# Patient Record
Sex: MALE | Race: WHITE | Employment: FULL TIME | URBAN - METROPOLITAN AREA
[De-identification: names, ages, dates, MRNs, and addresses within clinical notes are randomized per-mention and may not be internally consistent; named-entity substitution may affect disease eponyms.]

---

## 2020-06-08 PROCEDURE — 88305 TISSUE EXAM BY PATHOLOGIST: CPT | Performed by: INTERNAL MEDICINE

## 2020-11-11 ENCOUNTER — APPOINTMENT (OUTPATIENT)
Dept: GENERAL RADIOLOGY | Facility: HOSPITAL | Age: 55
DRG: 177 | End: 2020-11-11
Attending: EMERGENCY MEDICINE
Payer: COMMERCIAL

## 2020-11-11 ENCOUNTER — HOSPITAL ENCOUNTER (INPATIENT)
Facility: HOSPITAL | Age: 55
LOS: 5 days | Discharge: HOME | DRG: 177 | End: 2020-11-16
Attending: EMERGENCY MEDICINE | Admitting: HOSPITALIST
Payer: COMMERCIAL

## 2020-11-11 DIAGNOSIS — U07.1 PNEUMONIA DUE TO COVID-19 VIRUS: Primary | ICD-10-CM

## 2020-11-11 DIAGNOSIS — R09.02 HYPOXIA: ICD-10-CM

## 2020-11-11 DIAGNOSIS — J12.82 PNEUMONIA DUE TO COVID-19 VIRUS: Primary | ICD-10-CM

## 2020-11-11 PROCEDURE — XW033E5 INTRODUCTION OF REMDESIVIR ANTI-INFECTIVE INTO PERIPHERAL VEIN, PERCUTANEOUS APPROACH, NEW TECHNOLOGY GROUP 5: ICD-10-PCS | Performed by: HOSPITALIST

## 2020-11-11 PROCEDURE — 71045 X-RAY EXAM CHEST 1 VIEW: CPT | Performed by: EMERGENCY MEDICINE

## 2020-11-11 PROCEDURE — 3E033GC INTRODUCTION OF OTHER THERAPEUTIC SUBSTANCE INTO PERIPHERAL VEIN, PERCUTANEOUS APPROACH: ICD-10-PCS | Performed by: HOSPITALIST

## 2020-11-11 RX ORDER — DEXAMETHASONE 6 MG/1
6 TABLET ORAL DAILY
Status: DISCONTINUED | OUTPATIENT
Start: 2020-11-12 | End: 2020-11-16

## 2020-11-11 RX ORDER — ONDANSETRON 2 MG/ML
4 INJECTION INTRAMUSCULAR; INTRAVENOUS EVERY 6 HOURS PRN
Status: DISCONTINUED | OUTPATIENT
Start: 2020-11-11 | End: 2020-11-16

## 2020-11-11 RX ORDER — SODIUM CHLORIDE 0.9 % (FLUSH) 0.9 %
3 SYRINGE (ML) INJECTION AS NEEDED
Status: DISCONTINUED | OUTPATIENT
Start: 2020-11-11 | End: 2020-11-16

## 2020-11-11 RX ORDER — DEXAMETHASONE SODIUM PHOSPHATE 10 MG/ML
6 INJECTION, SOLUTION INTRAMUSCULAR; INTRAVENOUS ONCE
Status: COMPLETED | OUTPATIENT
Start: 2020-11-11 | End: 2020-11-11

## 2020-11-11 RX ORDER — ENOXAPARIN SODIUM 100 MG/ML
40 INJECTION SUBCUTANEOUS 2 TIMES DAILY
Status: DISCONTINUED | OUTPATIENT
Start: 2020-11-12 | End: 2020-11-16

## 2020-11-12 PROCEDURE — 99222 1ST HOSP IP/OBS MODERATE 55: CPT | Performed by: HOSPITALIST

## 2020-11-12 PROCEDURE — 99232 SBSQ HOSP IP/OBS MODERATE 35: CPT | Performed by: INTERNAL MEDICINE

## 2020-11-12 RX ORDER — SODIUM CHLORIDE 9 MG/ML
INJECTION, SOLUTION INTRAVENOUS ONCE
Status: DISCONTINUED | OUTPATIENT
Start: 2020-11-12 | End: 2020-11-16

## 2020-11-12 RX ORDER — ASCORBIC ACID 500 MG
500 TABLET ORAL DAILY
Status: DISCONTINUED | OUTPATIENT
Start: 2020-11-12 | End: 2020-11-16

## 2020-11-12 RX ORDER — ZINC SULFATE 50(220)MG
220 CAPSULE ORAL DAILY
Status: DISCONTINUED | OUTPATIENT
Start: 2020-11-12 | End: 2020-11-16

## 2020-11-12 RX ORDER — FAMOTIDINE 20 MG/1
20 TABLET ORAL 2 TIMES DAILY
Status: DISCONTINUED | OUTPATIENT
Start: 2020-11-12 | End: 2020-11-16

## 2020-11-12 RX ORDER — ACETAMINOPHEN 325 MG/1
650 TABLET ORAL EVERY 6 HOURS PRN
COMMUNITY

## 2020-11-12 RX ORDER — MAGNESIUM OXIDE 400 MG (241.3 MG MAGNESIUM) TABLET
1 TABLET NIGHTLY
Status: DISCONTINUED | OUTPATIENT
Start: 2020-11-12 | End: 2020-11-16

## 2020-11-12 RX ORDER — POTASSIUM CHLORIDE 20 MEQ/1
40 TABLET, EXTENDED RELEASE ORAL ONCE
Status: COMPLETED | OUTPATIENT
Start: 2020-11-12 | End: 2020-11-12

## 2020-11-12 RX ORDER — ACETAMINOPHEN 325 MG/1
650 TABLET ORAL EVERY 6 HOURS PRN
Status: DISCONTINUED | OUTPATIENT
Start: 2020-11-12 | End: 2020-11-16

## 2020-11-12 RX ORDER — ASCORBIC ACID 500 MG
500 TABLET ORAL DAILY
COMMUNITY

## 2020-11-12 RX ORDER — POTASSIUM CHLORIDE 20 MEQ/1
40 TABLET, EXTENDED RELEASE ORAL ONCE
Status: DISCONTINUED | OUTPATIENT
Start: 2020-11-12 | End: 2020-11-16

## 2020-11-12 NOTE — PROGRESS NOTES
ADMISSION NOTE    63year old male presents with no significant PMH presents with 5 days of cough and sob with increased sob over the past 2 days. Also reports multiple family members with whom he lives are covid postive.   Pt was found to be hypoxic and c

## 2020-11-12 NOTE — PAYOR COMM NOTE
--------------  ADMISSION REVIEW     Payor: [facility] #:  [id number]  Authorization Number: [id number]    Admit date: 11/11/20  Admit time: 2353       Admitting Physician: Doris Lind MD  Attending Physician:  Long Luna MD stated in HPI.     Physical Exam     ED Triage Vitals [11/11/20 1935]   /74   Pulse 83   Resp 18   Temp 98 °F (36.7 °C)   Temp src    SpO2 (!) 88 %   O2 Device None (Room air)       Current:/80   Pulse 85   Temp 98 °F (36.7 °C)   Resp 26   Ht 18 WITH DIFFERENTIAL WITH PLATELET.   Procedure                               Abnormality         Status                     ---------                               -----------         ------                     CBC W/ DIFFERENTIAL[[id number]] decadron. Remdisivir.   Conv plasma per id/pulmonary  Admission disposition: 11/11/2020  9:50 PM                        Disposition and Plan     Clinical Impression:  Pneumonia due to COVID-19 virus  (primary encounter diagnosis)  Hypoxia    Disposition: car rides, no other travel history. Denies any calf pain or swelling.   He reports that his shortness of breath is worse with very minimal activity and improves with rest.  He was actually scheduled to have a COVID test done today, but presented to the [facility] did have an endoscopic polypectomy in June of this year. MEDICATIONS:  Medications prior to admission:  Tylenol 325 mg every 6 hours as needed and vitamin C 500 mg daily.     FAMILY HISTORY:  The patient reports a strong family history of diabetes, but h as was chest x-ray. Urinalysis revealed 4 white cells, 0 red cells, no bacteria, few squamous epithelial cells. Blood cultures are pending. ASSESSMENT AND PLAN:    1.    COVID-positive pneumonia, possibly a combination of viral and bacterial.  Continue mg     Date Action Dose Route User    11/11/2020 2045 Given 6 mg Intravenous Angela Cottrell RN      Enoxaparin Sodium (LOVENOX) 40 MG/0.4ML injection 40 mg     Date Action Dose Route User    11/12/2020 0800 Given 40 mg Subcutaneous (Left Upper Abdomen) (1.829 m), weight (!) 301 lb 4.8 oz (136.7 kg), SpO2 90 %.     Constitutional: no acute distress  Eyes: PERRL  ENT: nares patent  Cardio: RRR, S1 S2  Respiratory: Crackles present  GI: abdomen soft, non tender  Extremities: no clubbing, cyanosis, edema  Ne liters. No fever.   Pulmonology following  Awaiting CCP  Continue remesivir  Continue decadron   Follow inflammatory markers   lovenox for DVT proph     Will resume care in AM

## 2020-11-12 NOTE — PLAN OF CARE
Problem: Patient Centered Care  Goal: Patient preferences are identified and integrated in the patient's plan of care  Description: Interventions:  - What would you like us to know as we care for you? \" I am an  and I work\".    - Provide timely, document skin integrity  - Monitor for areas of redness and/or skin breakdown  - Initiate interventions, skin care algorithm/standards of care as needed  Outcome: Not Progressing   Redness/rash to scrotum/groin-miconazole ordered, continue to monitor.     P Not Progressing     Problem: RESPIRATORY - ADULT  Goal: Achieves optimal ventilation and oxygenation  Description: INTERVENTIONS:  - Assess for changes in respiratory status  - Assess for changes in mentation and behavior  - Position to facilitate oxygenat

## 2020-11-12 NOTE — ED NOTES
Assumed care of patient from triage. Patient to ED from home for SOB. Patient states that multiple family members have tested COVID positive. Patient reports having symptoms for about 1 week.  Patient's oxygen saturation 84% on room air when he initially wa

## 2020-11-12 NOTE — PLAN OF CARE
Problem: Patient Centered Care  Goal: Patient preferences are identified and integrated in the patient's plan of care  Description: Interventions:  - What would you like us to know as we care for you? \" I am an  and I work\".    - Provide timely, Manage/alleviate anxiety  - Monitor for signs/symptoms of CO2 retention  -Pulmonology consult  -Therapeutic proning  -Incentive spirometer  -Follow up w/ pneumonia clinic post discharge  Outcome: Progressing     Problem: GASTROINTESTINAL - ADULT  Goal: Janis injury  Description: INTERVENTIONS:  - Assess pt frequently for physical needs  - Identify cognitive and physical deficits and behaviors that affect risk of falls.   - [place] fall precautions as indicated by assessment.  - Educate pt/family on patient sa

## 2020-11-12 NOTE — PLAN OF CARE
Covid +: 11/11/2020  Symptom Onset:11/07/2020  Tmax: Afebrile  O2: 3L at 90%    Monitor Labs  LDH: 421 ->  Ferritin: 562.0 ->  CRP: 9.80  DDimer: 0.36    Antibiotics: IV Azithromycin and Rocephin   Blood Thinner: lovenox  Decadron: started 11/11  Actemra:

## 2020-11-12 NOTE — ED NOTES
Orders for admission, patient is aware of plan and ready to go upstairs. Any questions, please call ED SHAUN Zarate  at extension [phone].    Type of COVID test sent:  COVID Suspicion level: /High    Titratable drug(s) infusing:  Rate: n/a    LOC at time of transp

## 2020-11-12 NOTE — PROGRESS NOTES
Post midnight follow up    Patient is stable saturating 90% on 3 liters. No fever.   Pulmonology following  Awaiting CCP  Continue remesivir  Continue decadron   Follow inflammatory markers   lovenox for DVT proph    Will resume care in AM    González Madden

## 2020-11-12 NOTE — ED INITIAL ASSESSMENT (HPI)
PT c/o uri symptoms with multiple COVID+ contacts at home, pt states he has been sob for the last week.

## 2020-11-12 NOTE — ED PROVIDER NOTES
Patient Seen in: [facility] Emergency Department    History   Patient presents with:  Difficulty Breathing    Stated Complaint:     HPI    Patient complains of shortness of breath that began 5 days ago.  + cough. no chest pain. + fever.   no debbie intact, no focal deficit noted  SKIN: good skin turgor, no  rashes  PSYCH: calm, cooperative,    Differential includes: pneumonia likely covid vs. CHF vs. bronchitis vs. PE      ED Course     Labs Reviewed   BASIC METABOLIC PANEL (8) - Abnormal; Notable fo ZIZSABRINA)[[id number]]                               Final result               ANTIBODY FBIYMP[[id number]]                                  Final result                 Please view results for these tests on the individual orders.    ABORH (BLOOD TYPE)   ANTIBODY patient.       Present on Admission           ICD-10-CM Noted POA    Pneumonia due to COVID-19 virus U07.1, J12.89 11/11/2020 Unknown

## 2020-11-12 NOTE — CONSULTS
[facility] HOSP - [facility]    Report of Consultation    Mia Marc Patient Status:  Inpatient    3/29/1965 MRN [id number]   Location [facility] 5SW/SE Attending Ciro Villaseñor MD   Hosp Day # 1 PCP Cholo Denson MD     Date of Admission:  6 chloride 0.9% 250 mL IVPB, 100 mg, Intravenous, Q24H    •  sodium chloride 0.9% IV bolus 500 mL, 500 mL, Intravenous, PRN    •  Normal Saline Flush 0.9 % injection 3 mL, 3 mL, Intravenous, PRN    •  Enoxaparin Sodium (LOVENOX) 40 MG/0.4ML injection 40 mg, CREATSERUM 0.82 11/12/2020    BUN 14 11/12/2020     11/12/2020    K 4.4 11/12/2020     11/12/2020    CO2 29.0 11/12/2020     (H) 11/12/2020    CA 8.3 (L) 11/12/2020    ALB 2.7 (L) 11/12/2020    ALKPHO 59 11/12/2020    TP 7.2 11/12/2020

## 2020-11-12 NOTE — H&P
[facility]    PATIENT'S NAME: Emy [facility]   ATTENDING PHYSICIAN: Niles Sacks, MD   PATIENT ACCOUNT#:   [id number]    LOCATION:  [address] [place] [address] RECORD #:   [id number]       YOB: 1965  ADMISSION DATE:       11/11/2020 on remdesivir as well.   Procalcitonin level was also elevated and, given the concern on x-ray for the possibility of a combination of bacterial and viral process, he was given ceftriaxone and Zithromax as well pending evaluation by Pulmonary and Infectious dry.  Crowding of the oropharynx was noted. NECK:  Supple. LUNGS:  Coarse breath sounds bilaterally. No wheezes or rhonchi. HEART:  Regular rate and rhythm. Normal S1, S2.  ABDOMEN:  Soft, nontender, with normoactive bowel sounds. No guarding.   No above.    (Also job [id number])    Dictated By Zandra Lake MD  d: 11/12/2020 07:10:59  t: 11/12/2020 09:55:13  Job [id number]  GEORGINAJ/

## 2020-11-12 NOTE — CONSULTS
[place] [place] [facility] - [facility] ID CONSULT NOTE    Alfredo Mayfield Patient Status:  Inpatient    3/29/1965 MRN [id number]   Location [facility] 5SW/SE Attending Agustin Scott MD   Hosp Day # 1 PCP Alejandra Teixeira MD       Reason for Cons sodium chloride 0.9% 250 mL IVPB, 100 mg, Intravenous, Q24H  •  sodium chloride 0.9% IV bolus 500 mL, 500 mL, Intravenous, PRN  •  Normal Saline Flush 0.9 % injection 3 mL, 3 mL, Intravenous, PRN  •  Enoxaparin Sodium (LOVENOX) 40 MG/0.4ML injection 40 mg, GFRNAA 96 98 99   CA 8.5 8.6 8.3*   ALB 3.0* 3.0* 2.7*    138 139   K 3.7 3.8 4.4    102 105   CO2 25.0 30.0 29.0   ALKPHO 60 55 59   AST 80* 74* 58*   ALT 71* 72* 69*   BILT 0.6 0.6 0.4   TP 7.5 7.6 7.2       Microbiology: Reviewed in EMR

## 2020-11-13 PROCEDURE — 99233 SBSQ HOSP IP/OBS HIGH 50: CPT | Performed by: HOSPITALIST

## 2020-11-13 PROCEDURE — 99232 SBSQ HOSP IP/OBS MODERATE 35: CPT | Performed by: INTERNAL MEDICINE

## 2020-11-13 NOTE — PROGRESS NOTES
Called blood bank to check on status of plasma. Blood bank is still waiting for plasma. They will call when it has been received.

## 2020-11-13 NOTE — PLAN OF CARE
Problem: Patient Centered Care  Goal: Patient preferences are identified and integrated in the patient's plan of care  Description: Interventions:  - What would you like us to know as we care for you? \" I am an  and I work\".    - Provide timely, Manage/alleviate anxiety  - Monitor for signs/symptoms of CO2 retention  -Pulmonology consult  -Therapeutic proning  -Incentive spirometer  -Follow up w/ pneumonia clinic post discharge  Outcome: Progressing     Problem: GASTROINTESTINAL - ADULT  Goal: Janis injury  Description: INTERVENTIONS:  - Assess pt frequently for physical needs  - Identify cognitive and physical deficits and behaviors that affect risk of falls.   - [place] fall precautions as indicated by assessment.  - Educate pt/family on patient sa

## 2020-11-13 NOTE — PLAN OF CARE
Problem: Patient Centered Care  Goal: Patient preferences are identified and integrated in the patient's plan of care  Description: Interventions:  - What would you like us to know as we care for you? \" I am an  and I work\".    - Provide timely, Manage/alleviate anxiety  - Monitor for signs/symptoms of CO2 retention  -Pulmonology consult  -Therapeutic proning  -Incentive spirometer  -Follow up w/ pneumonia clinic post discharge  Outcome: Progressing     Problem: GASTROINTESTINAL - ADULT  Goal: Janis injury  Description: INTERVENTIONS:  - Assess pt frequently for physical needs  - Identify cognitive and physical deficits and behaviors that affect risk of falls.   - [place] fall precautions as indicated by assessment.  - Educate pt/family on patient sa

## 2020-11-13 NOTE — PROGRESS NOTES
[facility]    Progress Note    Darryl Daley Patient Status:  Inpatient    3/29/1965 MRN [id number]   Location [facility] 5SW/SE Attending Royce Scanlon MD   Hosp Day # 2 PCP Shelby Weber MD       Subjective:    Darryl Daley 11/11/2020 at 9:34 PM          Ekg 12-lead    Result Date: 11/12/2020  ECG Report  Interpretation  -------------------------- Sinus Rhythm Low voltage in precordial leads.  ABNORMAL No previous ECGs available Electronically signed on 11/12/2020 at 12:06 by

## 2020-11-13 NOTE — DIETARY NOTE
ADULT NUTRITION INITIAL ASSESSMENT    Pt is at moderate nutrition risk. Pt does not meet malnutrition criteria.       RECOMMENDATIONS TO MD:  See Nutrition Intervention     NUTRITION DIAGNOSIS/PROBLEM:  Inadequate oral intake related to decreased ability t III  IBW: 190 lbs        159% IBW  Usual Body Wt: 310 lbs       97% UBW    WEIGHT HISTORY:  Patient Weight(s) for the past 336 hrs:   Weight   11/13/20 0534 (!) 137.3 kg (302 lb 12.8 oz)   11/12/20 0700 (!) 136.7 kg (301 lb 4.8 oz)   11/11/20 2354 (!) 136. Food and Nutrient Intake:      Monitor: adequacy of PO intake and tolerance of PO intake.   - Anthropometric Measurement:      Monitor weight  - Nutrition Goals:      PO greater than 75% of meals, labs WNL and prevent skin breakdown    DIETITIAN FOLLOW UP:

## 2020-11-13 NOTE — PROGRESS NOTES
[facility]     Progress Note        Saint Blanch Patient Status:  Inpatient    3/29/1965 MRN [id number]   Location [facility] 5SW/SE Attending Maryann Mcleod MD   Hosp Day # 2 PCP Nicolette Olmos MD       Subjective:   Patient se azithromycin (ZITHROMAX) 500 mg in sodium chloride 0.9% 250 mL IVPB, 500 mg, Intravenous, Q24H        Continuous Infusions:     Physical Exam  Constitutional: no acute distress  Eyes: PERRL  ENT: nares pateint  Cardio: RRR, S1 S2  Respiratory: Bilateral cr CCP  -Continue Decadron  -Continue to follow inflammatory markers  -Wean oxygen as tolerated  -DVT prophylaxis: Lovenox    Candelaria Amor DO  Pulmonary [address] [facility]

## 2020-11-13 NOTE — PLAN OF CARE
Covid +: 11/11/2020  Symptom Onset:11/07/2020  Tmax: Afebrile  O2: 2.5L at 90%, attempt to wean    Monitor Labs  LDH: 421 -> 393  Ferritin: 562.0 -> 457.4  CRP: 9.80 -> 5.51  DDimer: 0.36 -> 0.27    Antibiotics: IV Azithromycin and Rocephin   Blood Thinner

## 2020-11-14 PROCEDURE — 99233 SBSQ HOSP IP/OBS HIGH 50: CPT | Performed by: HOSPITALIST

## 2020-11-14 PROCEDURE — 99233 SBSQ HOSP IP/OBS HIGH 50: CPT | Performed by: INTERNAL MEDICINE

## 2020-11-14 RX ORDER — VANCOMYCIN HYDROCHLORIDE 125 MG/1
125 CAPSULE ORAL DAILY
Status: DISCONTINUED | OUTPATIENT
Start: 2020-11-14 | End: 2020-11-16

## 2020-11-14 NOTE — PLAN OF CARE
Patient reports dry eyes and dry mouth; encouraged patient to wear glasses instead of contacts. 3L O2 nasal cannula at rest. Denies chest pain, dizziness, or weakness. Independent with self care. Lovenox SQ an PO decadron given per order.   Calls appropri patient for signs and symptoms of electrolyte imbalances  - Administer electrolyte replacement as ordered  - Monitor response to electrolyte replacements, including rhythm and repeat lab results as appropriate  - Fluid restriction as ordered  - Instruct pa and transportation as appropriate  - Identify discharge learning needs (meds, wound care, etc)  - Arrange for interpreters to assist at discharge as needed  - Consider post-discharge preferences of patient/family/discharge partner  - Complete POLST form as

## 2020-11-14 NOTE — PROGRESS NOTES
[facility]    Progress Note    Sage Siu Patient Status:  Inpatient    3/29/1965 MRN [id number]   Location [facility] 5SW/SE Attending Steve Licea MD   Hosp Day # 3 PCP Boy Matthew MD        Subjective:     Ammonti prophylaxis: Lovenox    High risk / complex   D/w staff               Results:     Lab Results   Component Value Date    WBC 8.1 11/14/2020    HGB 14.4 11/14/2020    HCT 42.7 11/14/2020    .0 11/14/2020    CREATSERUM 0.60 (L) 11/14/2020    BUN 18 11

## 2020-11-14 NOTE — PLAN OF CARE
Problem: Patient Centered Care  Goal: Patient preferences are identified and integrated in the patient's plan of care  Description: Interventions:  - What would you like us to know as we care for you? \" I am an  and I work\".    - Provide timely, Manage/alleviate anxiety  - Monitor for signs/symptoms of CO2 retention  -Pulmonology consult  -Therapeutic proning  -Incentive spirometer  -Follow up w/ pneumonia clinic post discharge  Outcome: Progressing     Problem: GASTROINTESTINAL - ADULT  Goal: Janis injury  Description: INTERVENTIONS:  - Assess pt frequently for physical needs  - Identify cognitive and physical deficits and behaviors that affect risk of falls.   - [place] fall precautions as indicated by assessment.  - Educate pt/family on patient sa

## 2020-11-15 PROCEDURE — 99232 SBSQ HOSP IP/OBS MODERATE 35: CPT | Performed by: INTERNAL MEDICINE

## 2020-11-15 PROCEDURE — 99233 SBSQ HOSP IP/OBS HIGH 50: CPT | Performed by: HOSPITALIST

## 2020-11-15 NOTE — PROGRESS NOTES
[facility]    Progress Note    Lisbet Primus Patient Status:  Inpatient    3/29/1965 MRN [id number]   Location [facility] 5SW/SE Attending Polo Collins MD   Hosp Day # 3 PCP Brandon Meyers MD       Subjective:    Lisbet Primus Finalized by (CST): Cruz Pretty MD on 11/11/2020 at 9:34 PM               • vancomycin HCl  125 mg Oral Daily   • Potassium Chloride ER  40 mEq Oral Once   • Vitamin C  500 mg Oral Daily   • zinc sulfate  220 mg Oral Daily   • famotidine  20 mg Oral BID

## 2020-11-15 NOTE — PROGRESS NOTES
[facility]    Progress Note    Kevin Dunlap Patient Status:  Inpatient    3/29/1965 MRN [id number]   Location [facility] 5SW/SE Attending Cortney Buckley MD   Hosp Day # 4 PCP Jose L Hogan MD       Subjective:    Kevin Dunlap 2 g Intravenous Q24H   • dexamethasone  6 mg Oral Daily   • azithromycin  500 mg Intravenous Q24H     influenza virus vaccine PF, acetaminophen, sodium chloride 0.9%, Normal Saline Flush, ondansetron HCl      Assessment and Plan:     Pneumonia due to COVI

## 2020-11-15 NOTE — PLAN OF CARE
Problem: Patient Centered Care  Goal: Patient preferences are identified and integrated in the patient's plan of care  Description: Interventions:  - What would you like us to know as we care for you? \" I am an  and I work\".    - Provide timely, Manage/alleviate anxiety  - Monitor for signs/symptoms of CO2 retention  -Pulmonology consult  -Therapeutic proning  -Incentive spirometer  -Follow up w/ pneumonia clinic post discharge  Outcome: Progressing     Problem: GASTROINTESTINAL - ADULT  Goal: Janis injury  Description: INTERVENTIONS:  - Assess pt frequently for physical needs  - Identify cognitive and physical deficits and behaviors that affect risk of falls.   - [place] fall precautions as indicated by assessment.  - Educate pt/family on patient sa

## 2020-11-15 NOTE — PLAN OF CARE
Problem: Patient Centered Care  Goal: Patient preferences are identified and integrated in the patient's plan of care  Description: Interventions:  - What would you like us to know as we care for you? \" I am an  and I work\".    - Provide timely, Manage/alleviate anxiety  - Monitor for signs/symptoms of CO2 retention  -Pulmonology consult  -Therapeutic proning  -Incentive spirometer  -Follow up w/ pneumonia clinic post discharge  Outcome: Progressing     Problem: GASTROINTESTINAL - ADULT  Goal: Janis injury  Description: INTERVENTIONS:  - Assess pt frequently for physical needs  - Identify cognitive and physical deficits and behaviors that affect risk of falls.   - [place] fall precautions as indicated by assessment.  - Educate pt/family on patient sa

## 2020-11-16 VITALS
TEMPERATURE: 98 F | BODY MASS INDEX: 40.97 KG/M2 | SYSTOLIC BLOOD PRESSURE: 133 MMHG | HEIGHT: 72 IN | RESPIRATION RATE: 16 BRPM | OXYGEN SATURATION: 94 % | WEIGHT: 302.5 LBS | HEART RATE: 78 BPM | DIASTOLIC BLOOD PRESSURE: 68 MMHG

## 2020-11-16 PROCEDURE — 99232 SBSQ HOSP IP/OBS MODERATE 35: CPT | Performed by: INTERNAL MEDICINE

## 2020-11-16 RX ORDER — DEXAMETHASONE 2 MG/1
TABLET ORAL
Qty: 12 TABLET | Refills: 0 | Status: SHIPPED | OUTPATIENT
Start: 2020-11-16 | End: 2020-12-18 | Stop reason: ALTCHOICE

## 2020-11-16 RX ORDER — POTASSIUM CHLORIDE 20 MEQ/1
40 TABLET, EXTENDED RELEASE ORAL ONCE
Status: COMPLETED | OUTPATIENT
Start: 2020-11-16 | End: 2020-11-16

## 2020-11-16 NOTE — PROGRESS NOTES
[facility]    Progress Note    Dalila Cunningham Patient Status:  Inpatient    3/29/1965 MRN [id number]   Location [facility] 5SW/SE Attending Taniya Martínez MD   Hosp Day # 5 PCP Ann Wall MD        Subjective:     Ammonti 2.84 (H) 11/14/2020    MG 2.3 11/12/2020    TROP <0.045 11/12/2020     11/12/2020       No results found. Donell Munoz.  Aiden Ortiz MD  11/16/2020

## 2020-11-16 NOTE — CM/SW NOTE
SW self referral for O2. Pt is currently requiring 2LO2 at home, and does not use any at home. MD signed order in chart, RN documented sats. Alice Hancock @ [facility] informed. Plan:  DC home w/O2.     SW/CM to remain available for support and/or discharge plan

## 2020-11-16 NOTE — PLAN OF CARE
Covid +: 11/11/2020  Symptom Onset:11/07/2020  Tmax: Afebrile  O2: 2L at 94%, attempt to wean    Monitor Labs, will stop monitoring inflammatory markers, trending down  LDH: 421 -> 393 -> 322  Ferritin: 562.0 -> 457.4 -> 281.9  CRP: 9.80 -> 5.51 -> 2.84  D

## 2020-11-16 NOTE — PROGRESS NOTES
Patient's O2 sat on room air is 88% at rest.  2L NC applied. Pt's O2 sat on 2L NC when ambulating is 94%.

## 2020-11-16 NOTE — PROGRESS NOTES
[facility]    Progress Note      Assessment and Plan:    1. COVID pneumonia–patient is doing well clinically. He is currently off oxygen. Okay from my perspective to discharge home.     Recommendations: Await the convalescent nurys cruz

## 2020-11-16 NOTE — PLAN OF CARE
Problem: Patient Centered Care  Goal: Patient preferences are identified and integrated in the patient's plan of care  Description: Interventions:  - What would you like us to know as we care for you? \" I am an  and I work\".    - Provide timely, Manage/alleviate anxiety  - Monitor for signs/symptoms of CO2 retention  -Pulmonology consult  -Therapeutic proning  -Incentive spirometer  -Follow up w/ pneumonia clinic post discharge  Outcome: Progressing     Problem: GASTROINTESTINAL - ADULT  Goal: Janis injury  Description: INTERVENTIONS:  - Assess pt frequently for physical needs  - Identify cognitive and physical deficits and behaviors that affect risk of falls.   - [place] fall precautions as indicated by assessment.  - Educate pt/family on patient sa

## 2020-11-16 NOTE — PLAN OF CARE
Problem: Patient Centered Care  Goal: Patient preferences are identified and integrated in the patient's plan of care  Description: Interventions:  - What would you like us to know as we care for you? \" I am an  and I work\".    - Provide timely, Manage/alleviate anxiety  - Monitor for signs/symptoms of CO2 retention  -Pulmonology consult  -Therapeutic proning  -Incentive spirometer  -Follow up w/ pneumonia clinic post discharge  Outcome: Progressing     Problem: GASTROINTESTINAL - ADULT  Goal: Janis injury  Description: INTERVENTIONS:  - Assess pt frequently for physical needs  - Identify cognitive and physical deficits and behaviors that affect risk of falls.   - [place] fall precautions as indicated by assessment.  - Educate pt/family on patient sa

## 2020-11-17 NOTE — PAYOR COMM NOTE
--------------  DISCHARGE REVIEW    Payor: [employer] [place] #:  [id number]  Authorization Number: [id number]    Admit date: 11/11/20  Admit time:  2353  Discharge Date: 11/16/2020  4:34 PM     Admitting Physician: Nohemy Ayon MD  At No murmurs, rubs or gallops. Abdomen: Soft, nontender, nondistended. Positive bowel sounds. No rebound or guarding. Neurologic: No focal neurological deficits. Musculoskeletal: Moves all extremities.     Hospital Course:      Pneumonia due to COVID-19 up:     Follow-up Information     Kenia Orr MD In 2 weeks. Specialty: Family Medicine  Contact information:  Susan Orr MD In 1 week.     Specialty: [address] [place], [place] [address]

## 2020-11-17 NOTE — DISCHARGE SUMMARY
[facility]    Discharge Summary    Stefani Prajapati Patient Status:  Inpatient    3/29/1965 MRN [id number]   Location [facility] 5SW/SE Attending No att. providers found   Hosp Day # 5 PCP Elias Mcdaniel MD     Date of Admission:  status: Full    Complications: none     Consultants     Provider Role Specialty    Denia Villarreal DO Consulting Physician  PULMONARY DISEASES    Tania Miller MD Consulting Physician  HOSPITALIST    Navid Milan MD Consulting Physician

## 2020-11-19 ENCOUNTER — APPOINTMENT (OUTPATIENT)
Dept: CT IMAGING | Facility: HOSPITAL | Age: 55
End: 2020-11-19
Attending: EMERGENCY MEDICINE
Payer: COMMERCIAL

## 2020-11-19 ENCOUNTER — HOSPITAL ENCOUNTER (EMERGENCY)
Facility: HOSPITAL | Age: 55
Discharge: HOME | End: 2020-11-19
Attending: EMERGENCY MEDICINE
Payer: COMMERCIAL

## 2020-11-19 VITALS
OXYGEN SATURATION: 96 % | RESPIRATION RATE: 19 BRPM | TEMPERATURE: 98 F | HEART RATE: 90 BPM | HEIGHT: 76 IN | DIASTOLIC BLOOD PRESSURE: 88 MMHG | WEIGHT: 300 LBS | BODY MASS INDEX: 36.53 KG/M2 | SYSTOLIC BLOOD PRESSURE: 130 MMHG

## 2020-11-19 DIAGNOSIS — U07.1 PNEUMONIA DUE TO COVID-19 VIRUS: Primary | ICD-10-CM

## 2020-11-19 DIAGNOSIS — J12.82 PNEUMONIA DUE TO COVID-19 VIRUS: Primary | ICD-10-CM

## 2020-11-19 DIAGNOSIS — R55 SYNCOPE AND COLLAPSE: ICD-10-CM

## 2020-11-19 PROCEDURE — 85379 FIBRIN DEGRADATION QUANT: CPT | Performed by: EMERGENCY MEDICINE

## 2020-11-19 PROCEDURE — 99284 EMERGENCY DEPT VISIT MOD MDM: CPT

## 2020-11-19 PROCEDURE — 80048 BASIC METABOLIC PNL TOTAL CA: CPT | Performed by: EMERGENCY MEDICINE

## 2020-11-19 PROCEDURE — 71260 CT THORAX DX C+: CPT | Performed by: EMERGENCY MEDICINE

## 2020-11-19 PROCEDURE — 93010 ELECTROCARDIOGRAM REPORT: CPT | Performed by: EMERGENCY MEDICINE

## 2020-11-19 PROCEDURE — 93005 ELECTROCARDIOGRAM TRACING: CPT

## 2020-11-19 PROCEDURE — 85025 COMPLETE CBC W/AUTO DIFF WBC: CPT | Performed by: EMERGENCY MEDICINE

## 2020-11-19 PROCEDURE — 36415 COLL VENOUS BLD VENIPUNCTURE: CPT
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
## 2020-11-20 ENCOUNTER — VIRTUAL PHONE E/M (OUTPATIENT)
Dept: CARDIOLOGY CLINIC | Facility: HOSPITAL | Age: 55
End: 2020-11-20
Attending: NURSE PRACTITIONER
Payer: COMMERCIAL

## 2020-11-20 DIAGNOSIS — J12.82 PNEUMONIA DUE TO COVID-19 VIRUS: Primary | ICD-10-CM

## 2020-11-20 DIAGNOSIS — U07.1 PNEUMONIA DUE TO COVID-19 VIRUS: Primary | ICD-10-CM

## 2020-11-20 PROCEDURE — 99443 PR TELEPHONE EVAL AND MGNT EST PATIENT 21-30 MIN MEDICAL DISCUSSION: CPT | Performed by: NURSE PRACTITIONER

## 2020-11-20 NOTE — PATIENT INSTRUCTIONS
-please quarantine 20 days from onset of symptoms  -please wear your oxygen continuously  -please use your incentive spirometer 4 sets of 10 daily  -follow up with your primary care provider, Dr. Jenny Cary  -follow up with specialty care clinic in 3 weeks, debbie
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
## 2020-11-20 NOTE — PROGRESS NOTES
Virtual Telephone Check-In    Danisha Stacy verbally consents to a Virtual/Telephone Check-In visit on 11/20/20. Patient has been referred to the [facility] website at [email or web address] to review the yearly Consent to Treat document.     Patient Narinder Handley

## 2020-12-09 ENCOUNTER — VIRTUAL PHONE E/M (OUTPATIENT)
Dept: CARDIOLOGY CLINIC | Facility: HOSPITAL | Age: 55
End: 2020-12-09
Attending: NURSE PRACTITIONER
Payer: COMMERCIAL

## 2020-12-09 DIAGNOSIS — U07.1 PNEUMONIA DUE TO COVID-19 VIRUS: Primary | ICD-10-CM

## 2020-12-09 DIAGNOSIS — J12.82 PNEUMONIA DUE TO COVID-19 VIRUS: Primary | ICD-10-CM

## 2020-12-09 PROCEDURE — 99442 PR TELEPHONE EVAL AND MGNT EST PATIENT 11-20 MIN MEDICAL DISCUSSION: CPT | Performed by: NURSE PRACTITIONER

## 2020-12-09 NOTE — PROGRESS NOTES
Virtual Telephone Check-In    Margarete Collet verbally consents to a Virtual/Telephone Check-In visit on 12/9/20. Patient has been referred to the [facility] website at [email or web address] to review the yearly Consent to Treat document.     Patient Mason José Miguel

## 2020-12-09 NOTE — PATIENT INSTRUCTIONS
-please use your incentive spirometer 4 sets of 10 daily  -follow up with your primary care provider, Dr. Jeremiah Maravilla  -follow up with specialty care clinic next week for walk test

## 2020-12-18 ENCOUNTER — OFFICE VISIT (OUTPATIENT)
Dept: CARDIOLOGY CLINIC | Facility: HOSPITAL | Age: 55
End: 2020-12-18
Attending: NURSE PRACTITIONER
Payer: COMMERCIAL

## 2020-12-18 VITALS
HEART RATE: 97 BPM | BODY MASS INDEX: 37 KG/M2 | DIASTOLIC BLOOD PRESSURE: 77 MMHG | WEIGHT: 304 LBS | SYSTOLIC BLOOD PRESSURE: 123 MMHG | OXYGEN SATURATION: 92 %

## 2020-12-18 DIAGNOSIS — J12.82 PNEUMONIA DUE TO COVID-19 VIRUS: Primary | ICD-10-CM

## 2020-12-18 DIAGNOSIS — U07.1 PNEUMONIA DUE TO COVID-19 VIRUS: Primary | ICD-10-CM

## 2020-12-18 PROCEDURE — 94618 PULMONARY STRESS TESTING: CPT | Performed by: NURSE PRACTITIONER

## 2020-12-18 PROCEDURE — 99212 OFFICE O/P EST SF 10 MIN: CPT | Performed by: NURSE PRACTITIONER

## 2020-12-18 PROCEDURE — 99213 OFFICE O/P EST LOW 20 MIN: CPT | Performed by: NURSE PRACTITIONER

## 2020-12-18 RX ORDER — FUROSEMIDE 20 MG/1
TABLET ORAL
Status: COMPLETED
Start: 2020-12-18 | End: 2020-12-18

## 2020-12-18 RX ORDER — MULTIVITAMIN WITH IRON
100 TABLET ORAL DAILY
COMMUNITY

## 2020-12-18 RX ADMIN — FUROSEMIDE 40 MG: 20 TABLET ORAL at 14:47:00

## 2020-12-18 NOTE — PROGRESS NOTES
[address] Patient Status:  Outpatient    3/29/1965 MRN [id number]   Location [address] [facility] Stella Abreu MD         Hanna Arzate is a 63year old male who presents to clinic after recent hospital 07:27 AM    DDIMER 0.64 (H) 11/19/2020 12:40 PM    CRP 2.84 (H) 11/14/2020 07:48 AM    MG 2.3 11/12/2020 06:43 AM    TROP <0.045 11/12/2020 06:43 AM     11/12/2020 12:20 AM       Clinical labs drawn by MA: 6 minute walk -Results reviewed with patient Edema  -furosemide 20mg x 2 doses  -follow up with specialty care clinic on 12/29/20  -follow up with primary care provider, Dr. Anam Cuenca  -Follow up with a pulmonologist, seen by Dr. Irene Torres during hospitalization, provided phone number       Plan:  Take fur

## 2020-12-18 NOTE — PATIENT INSTRUCTIONS
Take furosemide 20mg (1 tablet) a day for 2 days, please call me on Monday and let me know how you feel    Use your incentive spirometer 4 sets of 10 daily    Have influenza vaccine if appropriate     Continue all your same medications     Call if having a

## 2020-12-21 ENCOUNTER — TELEPHONE (OUTPATIENT)
Dept: CARDIOLOGY CLINIC | Facility: HOSPITAL | Age: 55
End: 2020-12-21

## 2020-12-21 DIAGNOSIS — J12.82 PNEUMONIA DUE TO COVID-19 VIRUS: Primary | ICD-10-CM

## 2020-12-21 DIAGNOSIS — U07.1 PNEUMONIA DUE TO COVID-19 VIRUS: Primary | ICD-10-CM

## 2020-12-21 RX ORDER — FUROSEMIDE 20 MG/1
20 TABLET ORAL DAILY
Qty: 15 TABLET | Refills: 0 | Status: SHIPPED | OUTPATIENT
Start: 2020-12-21 | End: 2021-01-05 | Stop reason: WASHOUT

## 2020-12-21 RX ORDER — VITAMIN B COMPLEX
25 TABLET ORAL DAILY
COMMUNITY

## 2020-12-21 NOTE — TELEPHONE ENCOUNTER
Patient called for condition update. He reports feeling better after taking furosemide 20mg daily on Saturday and Sunday. Patient still having some shortness of breath with exertion. Reports feeling less bloated. No change in home weight.  Instructed froilan

## 2020-12-29 ENCOUNTER — OFFICE VISIT (OUTPATIENT)
Dept: CARDIOLOGY CLINIC | Facility: HOSPITAL | Age: 55
End: 2020-12-29
Attending: NURSE PRACTITIONER
Payer: COMMERCIAL

## 2020-12-29 VITALS
WEIGHT: 302.81 LBS | BODY MASS INDEX: 37 KG/M2 | HEART RATE: 82 BPM | DIASTOLIC BLOOD PRESSURE: 80 MMHG | SYSTOLIC BLOOD PRESSURE: 123 MMHG | OXYGEN SATURATION: 96 %

## 2020-12-29 DIAGNOSIS — U07.1 PNEUMONIA DUE TO COVID-19 VIRUS: Primary | ICD-10-CM

## 2020-12-29 DIAGNOSIS — J12.82 PNEUMONIA DUE TO COVID-19 VIRUS: Primary | ICD-10-CM

## 2020-12-29 DIAGNOSIS — I50.9 HEART FAILURE, UNSPECIFIED (HCC): ICD-10-CM

## 2020-12-29 PROCEDURE — 94618 PULMONARY STRESS TESTING: CPT | Performed by: NURSE PRACTITIONER

## 2020-12-29 PROCEDURE — 99212 OFFICE O/P EST SF 10 MIN: CPT | Performed by: NURSE PRACTITIONER

## 2020-12-29 PROCEDURE — 80048 BASIC METABOLIC PNL TOTAL CA: CPT | Performed by: NURSE PRACTITIONER

## 2020-12-29 PROCEDURE — 36415 COLL VENOUS BLD VENIPUNCTURE: CPT | Performed by: NURSE PRACTITIONER

## 2020-12-29 PROCEDURE — 99213 OFFICE O/P EST LOW 20 MIN: CPT | Performed by: NURSE PRACTITIONER

## 2020-12-29 NOTE — PATIENT INSTRUCTIONS
Take furosemide 20mg (1 tablet) a day for 3 days    Use your incentive spirometer 4 sets of 10 daily    Have influenza vaccine if appropriate     Continue all your same medications     Call if having any dizziness, lightheadedness, heart racing, palpitatio

## 2020-12-29 NOTE — PROGRESS NOTES
[address] 36 Patient Status:  Outpatient    3/29/1965 MRN [id number]   Location [address] [place] [address] Janak Steve MD         Sonido Shields is a 63year old male who presents to clinic after recent hospital AST 28 11/16/2020 07:27 AM    ALT 68 (H) 11/16/2020 07:27 AM    DDIMER 0.64 (H) 11/19/2020 12:40 PM    CRP 2.84 (H) 11/14/2020 07:48 AM    MG 2.3 11/12/2020 06:43 AM    TROP <0.045 11/12/2020 06:43 AM     11/12/2020 12:20 AM       Clinical labs drawn visits, sodium restricted diet, low sodium foods, fluid restrictions, daily weights, medication regimen s/s of pneumonia and copd exacerbation and when to call APN/clinic. Patient and family receptive.       Assessment:  Pneumonia due to COVID 19  -completed

## 2021-01-19 ENCOUNTER — HOSPITAL ENCOUNTER (OUTPATIENT)
Dept: GENERAL RADIOLOGY | Facility: HOSPITAL | Age: 56
Discharge: HOME | End: 2021-01-19
Attending: CLINICAL NURSE SPECIALIST
Payer: COMMERCIAL

## 2021-01-19 ENCOUNTER — OFFICE VISIT (OUTPATIENT)
Dept: CARDIOLOGY CLINIC | Facility: HOSPITAL | Age: 56
End: 2021-01-19
Attending: CLINICAL NURSE SPECIALIST
Payer: COMMERCIAL

## 2021-01-19 VITALS
SYSTOLIC BLOOD PRESSURE: 131 MMHG | HEART RATE: 86 BPM | OXYGEN SATURATION: 97 % | DIASTOLIC BLOOD PRESSURE: 80 MMHG | WEIGHT: 308.69 LBS | BODY MASS INDEX: 38 KG/M2

## 2021-01-19 DIAGNOSIS — J12.82 PNEUMONIA DUE TO COVID-19 VIRUS: Primary | ICD-10-CM

## 2021-01-19 DIAGNOSIS — U07.1 COVID-19 VIRUS INFECTION: ICD-10-CM

## 2021-01-19 DIAGNOSIS — U07.1 PNEUMONIA DUE TO COVID-19 VIRUS: Primary | ICD-10-CM

## 2021-01-19 PROBLEM — E66.9 OBESITY: Status: ACTIVE | Noted: 2021-01-19

## 2021-01-19 PROCEDURE — 99214 OFFICE O/P EST MOD 30 MIN: CPT | Performed by: CLINICAL NURSE SPECIALIST

## 2021-01-19 PROCEDURE — 99212 OFFICE O/P EST SF 10 MIN: CPT | Performed by: CLINICAL NURSE SPECIALIST

## 2021-01-19 PROCEDURE — 71046 X-RAY EXAM CHEST 2 VIEWS: CPT | Performed by: CLINICAL NURSE SPECIALIST

## 2021-01-19 PROCEDURE — 94618 PULMONARY STRESS TESTING: CPT | Performed by: CLINICAL NURSE SPECIALIST

## 2021-01-19 RX ORDER — FUROSEMIDE 20 MG/1
20 TABLET ORAL DAILY
Qty: 30 TABLET | Refills: 0 | Status: SHIPPED | OUTPATIENT
Start: 2021-01-19 | End: 2021-02-18

## 2021-01-19 NOTE — PROGRESS NOTES
[address] Patient Status:  Outpatient    3/29/1965 MRN [id number]   Location MD Dr Nola Good is a 63year old male who presents to clinic after recen AM    ALKPHO 66 11/16/2020 07:27 AM    BILT 0.3 11/16/2020 07:27 AM    TP 6.8 11/16/2020 07:27 AM    AST 28 11/16/2020 07:27 AM    ALT 68 (H) 11/16/2020 07:27 AM    DDIMER 0.64 (H) 11/19/2020 12:40 PM    CRP 2.84 (H) 11/14/2020 07:48 AM    MG 2.3 11/12/202 dropping to 87%, then increased to 3L after 200ft)  Level: Level I  Number of times stopped: 2  Recovery Results  Recovery SpO2: 92 %  Recovery HR: 99  Recovery oxygen flow (liters per minute): 3  6 Min Walk Results  Exertion Scale:  Somewhat hard  Dyspnea dinners, soups (not homemade), some cereal, vegetable juice, canned vegetables, lunch meats, processed meats like hotdogs, sausage, suero, pepperoni, soy sauce, pre-packaged rice or potatoes. Please remember to read nutrition labels for sodium content.

## 2021-01-19 NOTE — PATIENT INSTRUCTIONS
Start taking furosemide (lasix) 20 mg once daily    Repeat labs next week - no appt necessary    Monitor oxygen saturations daily with activity and if oxygen saturation remain >90% okay to remain off oxygen      Less than 2000 mg sodium/salt diet.  Common h

## 2021-01-27 ENCOUNTER — LAB ENCOUNTER (OUTPATIENT)
Dept: LAB | Facility: HOSPITAL | Age: 56
End: 2021-01-27
Attending: CLINICAL NURSE SPECIALIST
Payer: COMMERCIAL

## 2021-01-27 DIAGNOSIS — U07.1 COVID-19 VIRUS INFECTION: ICD-10-CM

## 2021-01-27 LAB
ANION GAP SERPL CALC-SCNC: 3 MMOL/L (ref 0–18)
BUN BLD-MCNC: 22 MG/DL (ref 7–18)
BUN/CREAT SERPL: 17.6 (ref 10–20)
CALCIUM BLD-MCNC: 9.1 MG/DL (ref 8.5–10.1)
CHLORIDE SERPL-SCNC: 109 MMOL/L (ref 98–112)
CO2 SERPL-SCNC: 29 MMOL/L (ref 21–32)
CREAT BLD-MCNC: 1.25 MG/DL
GLUCOSE BLD-MCNC: 88 MG/DL (ref 70–99)
OSMOLALITY SERPL CALC.SUM OF ELEC: 295 MOSM/KG (ref 275–295)
PATIENT FASTING Y/N/NP: NO
POTASSIUM SERPL-SCNC: 3.8 MMOL/L (ref 3.5–5.1)
SODIUM SERPL-SCNC: 141 MMOL/L (ref 136–145)

## 2021-01-27 PROCEDURE — 36415 COLL VENOUS BLD VENIPUNCTURE: CPT

## 2021-01-27 PROCEDURE — 80048 BASIC METABOLIC PNL TOTAL CA: CPT

## 2021-02-10 PROBLEM — E66.9 OBESITY: Status: RESOLVED | Noted: 2021-01-19 | Resolved: 2021-02-10

## 2021-02-10 PROBLEM — R06.83 SNORING: Status: ACTIVE | Noted: 2021-02-10

## 2021-02-10 PROBLEM — E66.01 CLASS 3 SEVERE OBESITY DUE TO EXCESS CALORIES WITH SERIOUS COMORBIDITY AND BODY MASS INDEX (BMI) OF 40.0 TO 44.9 IN ADULT (HCC): Status: ACTIVE | Noted: 2021-02-10

## (undated) NOTE — LETTER
Hospital Discharge Documentation  Please phone to schedule a hospital follow up appointment.     From: [address] [facility]  Phone: [phone]    Patient discharged time/date: 11/16/2020  4:34 PM  Patient discharge disposition:  Home or [place] Cardiovascular: S1, S2. Regular rate and rhythm. No murmurs, rubs or gallops. Abdomen: Soft, nontender, nondistended. Positive bowel sounds. No rebound or guarding. Neurologic: No focal neurological deficits. Musculoskeletal: Moves all extremities. Commonly known as: VITAMIN C      Take 500 mg by mouth daily. Refills: 0[SH.2]            Follow up: Follow-up Information     Milana Torres MD In 2 weeks.     Specialty: Family Medicine  Contact information:  [address] [address] [facility]